# Patient Record
Sex: MALE | Race: WHITE | NOT HISPANIC OR LATINO | Employment: FULL TIME | URBAN - METROPOLITAN AREA
[De-identification: names, ages, dates, MRNs, and addresses within clinical notes are randomized per-mention and may not be internally consistent; named-entity substitution may affect disease eponyms.]

---

## 2023-06-27 ENCOUNTER — HOSPITAL ENCOUNTER (EMERGENCY)
Facility: MEDICAL CENTER | Age: 33
End: 2023-06-27
Attending: EMERGENCY MEDICINE

## 2023-06-27 VITALS
WEIGHT: 147.49 LBS | BODY MASS INDEX: 21.11 KG/M2 | HEIGHT: 70 IN | TEMPERATURE: 98.4 F | RESPIRATION RATE: 20 BRPM | HEART RATE: 99 BPM | OXYGEN SATURATION: 97 % | SYSTOLIC BLOOD PRESSURE: 123 MMHG | DIASTOLIC BLOOD PRESSURE: 91 MMHG

## 2023-06-27 DIAGNOSIS — T07.XXXA ABRASIONS OF MULTIPLE SITES: ICD-10-CM
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
## 2023-06-27 DIAGNOSIS — S61.452A DOG BITE OF LEFT HAND, INITIAL ENCOUNTER: ICD-10-CM

## 2023-06-27 PROCEDURE — 90471 IMMUNIZATION ADMIN: CPT

## 2023-06-27 PROCEDURE — 99283 EMERGENCY DEPT VISIT LOW MDM: CPT

## 2023-06-27 PROCEDURE — 700111 HCHG RX REV CODE 636 W/ 250 OVERRIDE (IP): Performed by: EMERGENCY MEDICINE

## 2023-06-27 PROCEDURE — A9270 NON-COVERED ITEM OR SERVICE: HCPCS | Performed by: EMERGENCY MEDICINE

## 2023-06-27 PROCEDURE — 90715 TDAP VACCINE 7 YRS/> IM: CPT | Performed by: EMERGENCY MEDICINE

## 2023-06-27 PROCEDURE — 700102 HCHG RX REV CODE 250 W/ 637 OVERRIDE(OP): Performed by: EMERGENCY MEDICINE

## 2023-06-27 RX ORDER — AMOXICILLIN AND CLAVULANATE POTASSIUM 875; 125 MG/1; MG/1
1 TABLET, FILM COATED ORAL 2 TIMES DAILY
Qty: 14 TABLET | Refills: 0 | Status: ACTIVE | OUTPATIENT
Start: 2023-06-27 | End: 2023-07-04

## 2023-06-27 RX ORDER — AMOXICILLIN AND CLAVULANATE POTASSIUM 875; 125 MG/1; MG/1
1 TABLET, FILM COATED ORAL ONCE
Status: COMPLETED | OUTPATIENT
Start: 2023-06-27 | End: 2023-06-27

## 2023-06-27 RX ADMIN — CLOSTRIDIUM TETANI TOXOID ANTIGEN (FORMALDEHYDE INACTIVATED), CORYNEBACTERIUM DIPHTHERIAE TOXOID ANTIGEN (FORMALDEHYDE INACTIVATED), BORDETELLA PERTUSSIS TOXOID ANTIGEN (GLUTARALDEHYDE INACTIVATED), BORDETELLA PERTUSSIS FILAMENTOUS HEMAGGLUTININ ANTIGEN (FORMALDEHYDE INACTIVATED), BORDETELLA PERTUSSIS PERTACTIN ANTIGEN, AND BORDETELLA PERTUSSIS FIMBRIAE 2/3 ANTIGEN 0.5 ML: 5; 2; 2.5; 5; 3; 5 INJECTION, SUSPENSION INTRAMUSCULAR at 20:29

## 2023-06-27 RX ADMIN — AMOXICILLIN AND CLAVULANATE POTASSIUM 1 TABLET: 875; 125 TABLET, FILM COATED ORAL at 20:32

## 2023-06-28 NOTE — ED TRIAGE NOTES
Chief Complaint   Patient presents with    Dog Bite     Report of dog bite last week on the right side of the neck and buttock. Now, reporting of another dog bite from yesterday on the left thumb and right wrist. Claims the same stray dog bit him. Denies reporting both incident to animal control. Tetanus shot more than 10 years ago.     Pt ambulatory to triage with steady gait for above complaint.     Pt is GCS 15, speaking in full sentences, follows commands and responds appropriately to questions. Resp are even and unlabored.     Pt placed in ED lobby. Pt educated on triage process. Pt encouraged to alert staff for any changes.       Vitals:    06/27/23 1927   BP: (!) 129/91   Pulse: (!) 119   Resp: 18   Temp: 36.7 °C (98.1 °F)   SpO2: 96%

## 2023-06-28 NOTE — ED PROVIDER NOTES
"ER Provider Note    Scribed for Dr. Iggy Tillman M.D. by Eulalio Oliver. 6/27/2023  8:08 PM    Primary Care Provider: No primary care provider on file.    CHIEF COMPLAINT  Chief Complaint   Patient presents with    Dog Bite     Report of dog bite last week on the right side of the neck and buttock. Now, reporting of another dog bite from yesterday on the left thumb and right wrist. Claims the same stray dog bit him. Denies reporting both incident to animal control. Tetanus shot more than 10 years ago.     EXTERNAL RECORDS REVIEWED  None available    HPI/ROS    LIMITATION TO HISTORY   Select: : None    Figueroa Lama is a 32 y.o. male who presents to the ED for evaluation of dog bite onset last week. He was attacked by a dog in the neighborhood. His was bit all over his body, including his arms and legs. Yesterday he was bit again by the same dog on the left thumb and right wrist. Additionally he is complaining of headache, lack of focus and hot flashes, and is concerned that he has an infection. He states that since he has been bit, he has been \"delirious\". Tetanus not up to date.     PAST MEDICAL HISTORY  History reviewed. No pertinent past medical history.    SURGICAL HISTORY  History reviewed. No pertinent surgical history.    FAMILY HISTORY  History reviewed. No pertinent family history.    SOCIAL HISTORY   reports that he has been smoking cigarettes. He does not have any smokeless tobacco history on file. He reports current alcohol use. He reports current drug use.    CURRENT MEDICATIONS  Discharge Medication List as of 6/27/2023  8:41 PM          ALLERGIES  Patient has no known allergies.    PHYSICAL EXAM  BP (!) 129/91   Pulse (!) 119   Temp 36.7 °C (98.1 °F) (Temporal)   Resp 18   Ht 1.778 m (5' 10\")   Wt 66.9 kg (147 lb 7.8 oz)   SpO2 96% Comment: Room air  BMI 21.16 kg/m²   Constitutional: Alert in no apparent distress.  HENT: No signs of trauma, Bilateral external ears normal, Nose normal. "   Eyes: Pupils are equal and reactive, Conjunctiva normal, Non-icteric.   Neck: Normal range of motion, No tenderness, Supple, No stridor.   Lymphatic: No lymphadenopathy noted.   Cardiovascular: Regular rate and rhythm, no murmurs.   Thorax & Lungs: Normal breath sounds, No respiratory distress, No wheezing, No chest tenderness.   Abdomen: Bowel sounds normal, Soft, No tenderness, No masses, No pulsatile masses. No peritoneal signs.  Skin: Warm, Dry, No rash. Abrasions over the left forearm, puncture wound of the right forearm. Able to flex and extend at all joints. Abrasion over neck and right buttock with surrounding redness.   Back: No bony tenderness, No CVA tenderness.   Extremities: Intact distal pulses, No edema, No tenderness, No cyanosis.  Musculoskeletal: Good range of motion in all major joints. No tenderness to palpation or major deformities noted.   Neurologic: Alert , Normal motor function, Normal sensory function, No focal deficits noted.   Psychiatric: Affect normal, Judgment normal, Mood normal.     COURSE & MEDICAL DECISION MAKING    ED Observation Status? No; Patient does not meet criteria for ED Observation.     INITIAL ASSESSMENT AND PLAN  Care Narrative:       8:08 PM - Patient seen and evaluated at bedside. He presents following multiple attacks by a dog in the past week, is concerned that they are infected. Exam shows signs concerning for infection. Discussed plan of care, including starting the patient on antibiotics. Patient agrees to plan of care. Patient will be treated with Augmentin 875-125 mg and given his tetanus booster for his symptoms. Patient will now be discharged at this time. Discussed return precautions and plan for at home care. Sent Rx for Augmentin for infection. Patient verbalizes understanding and agreement to this plan of care.       ADDITIONAL PROBLEM LIST AND DISPOSITION  Patient with a dog bite.  Does appear to be some infection.  There is no tenosynovitis noted on  the thumb injury.  He has full range of motion.  There are multiple areas of dog bites with areas of infection.  There is no deep space infection or abscess.  Will give oral antibiotics.  He is not septic.  We will discharge home with strict return precautions and follow-up.               DISPOSITION AND DISCUSSIONS    Discussion of management with other [facility] or appropriate source(s): None     Escalation of care considered, and ultimately not performed: the patient was evaluated by myself, after discussion I have recommended the patient to be discharged.    Barriers to care at this time, including but not limited to: None    Decision tools and prescription drugs considered including, but not limited to: Antibiotics started on Augmentin .    DISPOSITION:  Patient will be discharged home in stable condition.    FOLLOW UP:  [facility]  [address] [facility] [address]  [phone]        OUTPATIENT MEDICATIONS:  Discharge Medication List as of 6/27/2023  8:41 PM        START taking these medications    Details   amoxicillin-clavulanate (AUGMENTIN) 875-125 MG Tab Take 1 Tablet by mouth 2 times a day for 7 days., Disp-14 Tablet, R-0, Normal            FINAL IMPRESSION   1. Dog bite, initial encounter    2. Dog bite of left hand, initial encounter    3. Abrasions of multiple sites      Eulalio RAMOS (Bret), am scribing for, and in the presence of, Iggy Tillman M.D..    Electronically signed by: Eulalio Oliver (Bret), 6/27/2023    Iggy RAMOS M.D. personally performed the services described in this documentation, as scribed by Eulalio Oliver in my presence, and it is both accurate and complete.    The note accurately reflects work and decisions made by me.  Iggy Tillman M.D.  6/27/2023  9:35 PM